# Patient Record
Sex: MALE
[De-identification: names, ages, dates, MRNs, and addresses within clinical notes are randomized per-mention and may not be internally consistent; named-entity substitution may affect disease eponyms.]

---

## 2018-02-15 ENCOUNTER — APPOINTMENT (OUTPATIENT)
Dept: UROLOGY | Facility: CLINIC | Age: 33
End: 2018-02-15
Payer: COMMERCIAL

## 2018-02-15 PROCEDURE — 99244 OFF/OP CNSLTJ NEW/EST MOD 40: CPT

## 2018-02-20 LAB
ALBUMIN SERPL ELPH-MCNC: 4.6 G/DL
ALP BLD-CCNC: 64 U/L
ALT SERPL-CCNC: 23 U/L
ANION GAP SERPL CALC-SCNC: 16 MMOL/L
APPEARANCE: CLEAR
AST SERPL-CCNC: 18 U/L
BASOPHILS # BLD AUTO: 0.01 K/UL
BASOPHILS NFR BLD AUTO: 0.2 %
BILIRUB SERPL-MCNC: 0.8 MG/DL
BILIRUBIN URINE: NEGATIVE
BLOOD URINE: NEGATIVE
BUN SERPL-MCNC: 18 MG/DL
CALCIUM SERPL-MCNC: 9.8 MG/DL
CHLORIDE SERPL-SCNC: 102 MMOL/L
CHOLEST SERPL-MCNC: 179 MG/DL
CHOLEST/HDLC SERPL: 2.9 RATIO
CO2 SERPL-SCNC: 26 MMOL/L
COLOR: YELLOW
CREAT SERPL-MCNC: 1.07 MG/DL
EOSINOPHIL # BLD AUTO: 0.03 K/UL
EOSINOPHIL NFR BLD AUTO: 0.5 %
ESTRADIOL SERPL-MCNC: 16 PG/ML
FSH SERPL-MCNC: 5 IU/L
GLUCOSE QUALITATIVE U: NEGATIVE MG/DL
GLUCOSE SERPL-MCNC: 87 MG/DL
HBA1C MFR BLD HPLC: 5.3 %
HCT VFR BLD CALC: 46.9 %
HDLC SERPL-MCNC: 61 MG/DL
HGB BLD-MCNC: 15.9 G/DL
IMM GRANULOCYTES NFR BLD AUTO: 0 %
KETONES URINE: NEGATIVE
LDLC SERPL CALC-MCNC: 98 MG/DL
LEUKOCYTE ESTERASE URINE: NEGATIVE
LH SERPL-ACNC: 4.1 IU/L
LYMPHOCYTES # BLD AUTO: 1.64 K/UL
LYMPHOCYTES NFR BLD AUTO: 28.4 %
MAN DIFF?: NORMAL
MCHC RBC-ENTMCNC: 31.5 PG
MCHC RBC-ENTMCNC: 33.9 GM/DL
MCV RBC AUTO: 93.1 FL
MONOCYTES # BLD AUTO: 0.43 K/UL
MONOCYTES NFR BLD AUTO: 7.4 %
NEUTROPHILS # BLD AUTO: 3.67 K/UL
NEUTROPHILS NFR BLD AUTO: 63.5 %
NITRITE URINE: NEGATIVE
PH URINE: 7
PLATELET # BLD AUTO: 193 K/UL
POTASSIUM SERPL-SCNC: 4.6 MMOL/L
PROT SERPL-MCNC: 7.3 G/DL
PROTEIN URINE: NEGATIVE MG/DL
RBC # BLD: 5.04 M/UL
RBC # FLD: 12.2 %
SODIUM SERPL-SCNC: 144 MMOL/L
SPECIFIC GRAVITY URINE: 1.02
TRIGL SERPL-MCNC: 98 MG/DL
UROBILINOGEN URINE: NEGATIVE MG/DL
WBC # FLD AUTO: 5.78 K/UL

## 2018-02-21 LAB
TESTOST BND SERPL-MCNC: 9 PG/ML
TESTOST SERPL-MCNC: 502.8 NG/DL

## 2018-07-17 ENCOUNTER — APPOINTMENT (OUTPATIENT)
Dept: UROLOGY | Facility: CLINIC | Age: 33
End: 2018-07-17
Payer: COMMERCIAL

## 2018-07-17 DIAGNOSIS — Z30.09 ENCOUNTER FOR OTHER GENERAL COUNSELING AND ADVICE ON CONTRACEPTION: ICD-10-CM

## 2018-07-17 DIAGNOSIS — R39.9 UNSPECIFIED SYMPTOMS AND SIGNS INVOLVING THE GENITOURINARY SYSTEM: ICD-10-CM

## 2018-07-17 PROCEDURE — 99214 OFFICE O/P EST MOD 30 MIN: CPT

## 2018-07-18 LAB
APPEARANCE: CLEAR
BILIRUBIN URINE: NEGATIVE
BLOOD URINE: NEGATIVE
COLOR: YELLOW
GLUCOSE QUALITATIVE U: NEGATIVE MG/DL
KETONES URINE: NEGATIVE
LEUKOCYTE ESTERASE URINE: NEGATIVE
NITRITE URINE: NEGATIVE
PH URINE: 8
PROTEIN URINE: NEGATIVE MG/DL
SPECIFIC GRAVITY URINE: 1.01
UROBILINOGEN URINE: NEGATIVE MG/DL

## 2018-07-19 LAB
C TRACH RRNA SPEC QL NAA+PROBE: NOT DETECTED
N GONORRHOEA RRNA SPEC QL NAA+PROBE: NOT DETECTED
SOURCE AMPLIFICATION: NORMAL

## 2022-06-02 ENCOUNTER — NON-APPOINTMENT (OUTPATIENT)
Age: 37
End: 2022-06-02

## 2022-09-26 ENCOUNTER — NON-APPOINTMENT (OUTPATIENT)
Age: 37
End: 2022-09-26

## 2024-05-01 ENCOUNTER — NON-APPOINTMENT (OUTPATIENT)
Age: 39
End: 2024-05-01

## 2024-07-25 ENCOUNTER — APPOINTMENT (OUTPATIENT)
Dept: MRI IMAGING | Facility: CLINIC | Age: 39
End: 2024-07-25

## 2024-07-25 PROCEDURE — 72148 MRI LUMBAR SPINE W/O DYE: CPT

## 2024-08-08 ENCOUNTER — APPOINTMENT (OUTPATIENT)
Dept: ORTHOPEDIC SURGERY | Facility: CLINIC | Age: 39
End: 2024-08-08

## 2024-08-08 PROCEDURE — 99203 OFFICE O/P NEW LOW 30 MIN: CPT

## 2024-08-08 NOTE — HISTORY OF PRESENT ILLNESS
[Lower back] : lower back [Dull/Aching] : dull/aching [Radiating] : radiating [Constant] : constant [Nothing helps with pain getting better] : Nothing helps with pain getting better [de-identified] : 8/8/24: 40 yo M presenting with low back pain that started on 06/2024 when picking up heavy luggage. Radiates down anterior and lateral left thigh w/ burning sensation and n/t in toes. Pain exacerbates when sleeping on his side, Noticed small protrusion in low back. Saw PCP; had MRI L Spine completed @ [facility]. Tried Nsaids & heating pads with some relief. No PT or further tx. Pain has improved since onset.  [FreeTextEntry7] : left leg [] : no [de-identified] : MRI L SPINE @ [facility]

## 2024-08-08 NOTE — IMAGING
[de-identified] : L Spine Inspection: No defects or deformities Palpation: No tenderness or spasms in b/l lumbar paraspinal musculature ROM: Full with stiffness Strength: 5/5 b/l hip flexors, knee extensors, ankle dorsiflexors, EHL, ankle plantarflexors Neuro: Sensation LT I Negative b/l SLR Toe and heal walk intact Gait non antalgic

## 2024-08-08 NOTE — DATA REVIEWED
[MRI] : MRI [Lumbar Spine] : lumbar spine [Report was reviewed and noted in the chart] : The report was reviewed and noted in the chart [I independently reviewed and interpreted images and report] : I independently reviewed and interpreted images and report [FreeTextEntry1] : @PACS

## 2024-08-08 NOTE — ASSESSMENT
[FreeTextEntry1] : 38 y/o M with acute LLE radiculopathy x 2 months after heavy lifting. MRI shows transitional anatomy and bulge/HNP on the left at L3-4 contacting L4. On exam, no weakness or loss of sensation. Tried OTC meds and heat with good relief. Overall symptoms have improved since onset.   - Recommend starting a course of PT.  - Discussed role of injections if/when radicular symptoms worsen.  - Advised changing positions when working since he sits for majority of the day.  - f/u 6 weeks if symptoms persist